# Patient Record
Sex: FEMALE | Race: OTHER | URBAN - METROPOLITAN AREA
[De-identification: names, ages, dates, MRNs, and addresses within clinical notes are randomized per-mention and may not be internally consistent; named-entity substitution may affect disease eponyms.]

---

## 2024-05-08 ENCOUNTER — OFFICE VISIT (OUTPATIENT)
Age: 39
End: 2024-05-08
Payer: COMMERCIAL

## 2024-05-08 VITALS
SYSTOLIC BLOOD PRESSURE: 120 MMHG | WEIGHT: 256 LBS | OXYGEN SATURATION: 99 % | HEART RATE: 73 BPM | BODY MASS INDEX: 43.94 KG/M2 | DIASTOLIC BLOOD PRESSURE: 80 MMHG

## 2024-05-08 DIAGNOSIS — M25.50 POLYARTHRALGIA: ICD-10-CM

## 2024-05-08 DIAGNOSIS — R42 VERTIGO: ICD-10-CM

## 2024-05-08 DIAGNOSIS — R53.83 FATIGUE, UNSPECIFIED TYPE: ICD-10-CM

## 2024-05-08 DIAGNOSIS — E55.9 VITAMIN D DEFICIENCY: ICD-10-CM

## 2024-05-08 DIAGNOSIS — R79.0 LOW FERRITIN LEVEL: ICD-10-CM

## 2024-05-08 DIAGNOSIS — G43.719 INTRACTABLE CHRONIC MIGRAINE WITHOUT AURA AND WITHOUT STATUS MIGRAINOSUS: ICD-10-CM

## 2024-05-08 DIAGNOSIS — G43.719 INTRACTABLE CHRONIC MIGRAINE WITHOUT AURA AND WITHOUT STATUS MIGRAINOSUS: Primary | ICD-10-CM

## 2024-05-08 PROBLEM — E78.00 HIGH BLOOD CHOLESTEROL LEVEL: Status: ACTIVE | Noted: 2024-05-08

## 2024-05-08 PROBLEM — E78.00 HYPERCHOLESTEROLEMIA: Status: ACTIVE | Noted: 2018-10-02

## 2024-05-08 PROCEDURE — 99205 OFFICE O/P NEW HI 60 MIN: CPT | Performed by: PSYCHIATRY & NEUROLOGY

## 2024-05-08 RX ORDER — TOPIRAMATE 50 MG/1
50 TABLET, FILM COATED ORAL DAILY
Qty: 90 TABLET | Refills: 3 | Status: SHIPPED | OUTPATIENT
Start: 2024-05-08

## 2024-05-08 ASSESSMENT — PATIENT HEALTH QUESTIONNAIRE - PHQ9
SUM OF ALL RESPONSES TO PHQ QUESTIONS 1-9: 0
1. LITTLE INTEREST OR PLEASURE IN DOING THINGS: NOT AT ALL
SUM OF ALL RESPONSES TO PHQ QUESTIONS 1-9: 0

## 2024-05-08 NOTE — ASSESSMENT & PLAN NOTE
Longstanding headaches and migraines seemingly moving into transformed migraines patient is to be on preventative therapy we discussed options we have elected to go on Topamax we will start with 50 mg nightly, and serious side effects were discussed    Patient has been encouraged to make sure she increases her fluid intake of free water and we will check additional lab work to make sure there is no contributing factors    At this time no rescue medicine will be prescribed

## 2024-05-08 NOTE — ASSESSMENT & PLAN NOTE
Possibly with sleep disorder referral to sleep clinic.  Also checking blood work for additional pathology contributing to symptoms

## 2024-05-08 NOTE — PATIENT INSTRUCTIONS
As a reminder:   Please come to your appointment 15 minutes before your office appointment.  This way, you can get checked in at the  and checked in by the nursing staff so you have the full allotment of time with your provider for your visit.  Please bring an up-to-date and accurate list of all your medications.  Or bring all your active prescription bottles with you at the time of your office visit and this includes over-the-counter medications so we can make sure that your medication list is up-to-date.  If you are scheduled for a virtual visit, please be aware that the  will need to check you in and usually the day before to verify insurance and collect co-pays as appropriate.  Please be prepared for the second call which will be from the nurse to go over your medications and any other vital information.  This will probably be done 30 minutes prior to your visit.  The reason why we do this early is that you can get the full benefit of your appointment time with your provider.  Finally you will be given the link for your virtual visit please click into your link 10 minutes prior to your appointment and please wait patiently for the provider to join you        As per discussion  We have given you a medical release form please fill out the information regarding your ENT provider in [place] and mail that out to them so we can get the records.  If you are able to find medical records at home bring it with you at your next office visit    We have started you on a medication called Topamax other 90s known as topiramate 50 mg take 1 at bedtime.  This is to help reduce frequency intensity and duration of your migraines.  In about 6 weeks if you are not noticing any significant improvement your migraines reach out to me via [employer] and let me know and I can increase the Topamax to a total of 100 mg as long as you are not having any side effects which I am not anticipating    Increase your fluid intake

## 2024-05-08 NOTE — ASSESSMENT & PLAN NOTE
Still occasionally getting intermittent bouts of vertigo with tinnitus  I have asked the patient to sign a medical consent so I can get records from her ENT in [place] for review     Lab work has been ordered.  We are going to manage her migraines this may be component of her migraines.  We might need to do neurodiagnostics but I like to review the records first before ordering any additional tests

## 2024-05-08 NOTE — PROGRESS NOTES
Daily headaches, said it varies in pain levels  Mentions typically R sided but at times can be the whole head  Nausea, dizziness  Possible presyncope   PRN takes OTC, will help minor  Has been prescribed Fioricet but caused excessive fatigue    
  Component Value Date    TSH 2.25 04/19/2023     Lab Results   Component Value Date/Time    VITD25 11.2 04/19/2023 09:50 AM            No Known Allergies     Current Outpatient Medications   Medication Sig Dispense Refill    topiramate (TOPAMAX) 50 MG tablet Take 1 tablet by mouth daily 90 tablet 3     No current facility-administered medications for this visit.        Past medical history/surgical history, family history, and social history have been reviewed for today's visit      ROS    Review of Systems   Musculoskeletal:  Positive for arthralgias.   Neurological:  Positive for dizziness.        And intermittent tinnitus   All other systems reviewed and are negative.             EXAMINATION:     Vitals:    05/08/24 1002   BP: 120/80   Site: Left Upper Arm   Position: Sitting   Cuff Size: Large Adult   Pulse: 73   SpO2: 99%   Weight: 116.1 kg (256 lb)              5/8/2024    10:04 AM   PHQ-9    Little interest or pleasure in doing things 0   PHQ-9 Total Score 0        General:  Patient is well-developed and well-nourished in no apparent distress  Affect is appropriate   Normocephalic without evidence of trauma  Heart sounds normal no additional sounds heard breath sounds clear to auscultation no cervical masses or bruits appreciated  No tenderness in the head or neck area to palpation  Good peripheral pulses no edema is noted  No rashes unusual bruising or bleeding lacerations are atypical discoloration appreciated on general inspection    Neuro Exam  Mental status  Alert and oriented x3  No word blocking appreciated  No evidence of processing difficulty on general observation  Able to follow commands without difficulty    Cranial nerves  PERRLA   eyes move in all planes  No nystagmus appreciated  Gaze is conjugate  Good facial symmetry  Facial sensation intact bilaterally in all 3 zones  Good voice projection and articulation  Uvula elevates symmetrically  No evidence of swallowing difficulties  Tongue

## 2024-05-09 LAB
25(OH)D3 SERPL-MCNC: 28.9 NG/ML (ref 30–100)
CRP SERPL-MCNC: <0.29 MG/DL (ref 0–0.3)
ERYTHROCYTE [SEDIMENTATION RATE] IN BLOOD: 4 MM/HR (ref 0–20)
FERRITIN SERPL-MCNC: 3 NG/ML (ref 8–252)

## 2024-05-10 RX ORDER — FERROUS SULFATE 325(65) MG
325 TABLET ORAL
Qty: 90 TABLET | Refills: 0 | Status: SHIPPED | OUTPATIENT
Start: 2024-05-10

## 2024-05-12 LAB
ANA TITR SER IF: NEGATIVE
LABORATORY COMMENT REPORT: NORMAL

## 2024-08-12 ENCOUNTER — OFFICE VISIT (OUTPATIENT)
Age: 39
End: 2024-08-12
Payer: COMMERCIAL

## 2024-08-12 VITALS
HEART RATE: 73 BPM | SYSTOLIC BLOOD PRESSURE: 120 MMHG | OXYGEN SATURATION: 97 % | HEIGHT: 64 IN | DIASTOLIC BLOOD PRESSURE: 64 MMHG | TEMPERATURE: 98 F | WEIGHT: 252.1 LBS | BODY MASS INDEX: 43.04 KG/M2

## 2024-08-12 DIAGNOSIS — E66.01 SEVERE OBESITY (BMI >= 40) (HCC): ICD-10-CM

## 2024-08-12 DIAGNOSIS — G47.33 OBSTRUCTIVE SLEEP APNEA (ADULT) (PEDIATRIC): Primary | ICD-10-CM

## 2024-08-12 PROCEDURE — 99204 OFFICE O/P NEW MOD 45 MIN: CPT | Performed by: INTERNAL MEDICINE

## 2024-08-12 ASSESSMENT — SLEEP AND FATIGUE QUESTIONNAIRES
HOW LIKELY ARE YOU TO NOD OFF OR FALL ASLEEP WHILE SITTING INACTIVE IN A PUBLIC PLACE: WOULD NEVER DOZE
HOW LIKELY ARE YOU TO NOD OFF OR FALL ASLEEP WHILE SITTING QUIETLY AFTER LUNCH WITHOUT ALCOHOL: WOULD NEVER DOZE
HOW LIKELY ARE YOU TO NOD OFF OR FALL ASLEEP IN A CAR, WHILE STOPPED FOR A FEW MINUTES IN TRAFFIC: WOULD NEVER DOZE
HOW LIKELY ARE YOU TO NOD OFF OR FALL ASLEEP WHILE SITTING AND READING: WOULD NEVER DOZE
HOW LIKELY ARE YOU TO NOD OFF OR FALL ASLEEP WHILE SITTING AND TALKING TO SOMEONE: WOULD NEVER DOZE
ESS TOTAL SCORE: 4
HOW LIKELY ARE YOU TO NOD OFF OR FALL ASLEEP WHILE LYING DOWN TO REST IN THE AFTERNOON WHEN CIRCUMSTANCES PERMIT: MODERATE CHANCE OF DOZING
HOW LIKELY ARE YOU TO NOD OFF OR FALL ASLEEP WHEN YOU ARE A PASSENGER IN A CAR FOR AN HOUR WITHOUT A BREAK: WOULD NEVER DOZE
HOW LIKELY ARE YOU TO NOD OFF OR FALL ASLEEP WHILE WATCHING TV: MODERATE CHANCE OF DOZING

## 2024-08-12 NOTE — PROGRESS NOTES
[address]  [place] [address]  Tel.  [phone]  Fax. [phone] [address]  [place] [address]  Tel.  [phone]  Fax. [phone] [address] [address]  Tel.  [phone]  Fax. [phone]         Subjective:      Shantell Cardenas is an 39 y.o. female referred for evaluation for a sleep disorder. She complains of frequent morning headaches associated with snoring.  Symptoms began several years ago, gradually improving since that time. She usually can fall asleep in 10-15 minutes.  Family or house members note snoring. She denies falling asleep when driving.  Shantell Cardenas does not wake up frequently at night. She is not bothered by waking up too early and left unable to get back to sleep. She actually sleeps about 7 hours at night and wakes up about   0-1 times during the night. She does work shifts: [employer].   Shantell indicates she does not get too little sleep at night. Her bedtime is 1100. She awakens at 0600. She does take naps. She takes 2 naps a week lasting 30 to 45. She has the following observed behaviors:  ;  .  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 a theatre or a meeting) 0   As a passenger in a car for an hour without a break 0   Lying down to rest in the afternoon when circumstances permit 2   Sitting and talking to someone 0   Sitting quietly after a lunch without alcohol 0   In a car, while stopped for a few minutes in traffic 0   [place] Sleepiness Score 4   Neck (Inches) 15       No Known Allergies      Current Outpatient Medications:     VITAMIN D PO, Take by mouth, Disp: , Rfl:     ferrous sulfate (IRON 325) 325 (65 Fe) MG tablet, Take 1 tablet by mouth daily (with breakfast), Disp: 90 tablet, Rfl: 0    topiramate (TOPAMAX) 50 MG

## 2024-09-05 ENCOUNTER — PROCEDURE VISIT (OUTPATIENT)
Age: 39
End: 2024-09-05

## 2024-09-05 ENCOUNTER — HOSPITAL ENCOUNTER (OUTPATIENT)
Facility: HOSPITAL | Age: 39
Discharge: HOME | End: 2024-09-08
Payer: COMMERCIAL

## 2024-09-05 DIAGNOSIS — G47.33 OBSTRUCTIVE SLEEP APNEA (ADULT) (PEDIATRIC): Primary | ICD-10-CM

## 2024-09-05 PROCEDURE — 95800 SLP STDY UNATTENDED: CPT | Performed by: INTERNAL MEDICINE

## 2024-09-05 NOTE — PROGRESS NOTES
S>Shantell Cardenas is a 39 y.o. female seen today to receive a home sleep testing unit (WatchPAT).    Patient was educated on proper hookup and operation of the WatchPAT HST via detailed instruction sheet .  Instruction forms with after hours contact and documentation were signed.    O>    There were no vitals taken for this visit.      A>  1. Obstructive sleep apnea (adult) (pediatric)          P>  General information regarding operations and maintenance of the device was provided.  Follow-up appointment was made to return the WatchPAT HST. She will be contacted once the results have been reviewed.  She was asked to contact our office for any problems regarding her home sleep test study.

## 2024-09-10 ENCOUNTER — CLINICAL DOCUMENTATION (OUTPATIENT)
Age: 39
End: 2024-09-10

## 2024-09-12 ENCOUNTER — TELEPHONE (OUTPATIENT)
Age: 39
End: 2024-09-12

## 2024-11-19 ENCOUNTER — OFFICE VISIT (OUTPATIENT)
Age: 39
End: 2024-11-19
Payer: COMMERCIAL

## 2024-11-19 VITALS
BODY MASS INDEX: 42.47 KG/M2 | HEART RATE: 58 BPM | WEIGHT: 248.8 LBS | TEMPERATURE: 97.9 F | HEIGHT: 64 IN | OXYGEN SATURATION: 99 % | SYSTOLIC BLOOD PRESSURE: 122 MMHG | RESPIRATION RATE: 16 BRPM | DIASTOLIC BLOOD PRESSURE: 60 MMHG

## 2024-11-19 DIAGNOSIS — R79.0 LOW FERRITIN LEVEL: ICD-10-CM

## 2024-11-19 DIAGNOSIS — R53.83 OTHER FATIGUE: ICD-10-CM

## 2024-11-19 DIAGNOSIS — G43.719 INTRACTABLE CHRONIC MIGRAINE WITHOUT AURA AND WITHOUT STATUS MIGRAINOSUS: Primary | ICD-10-CM

## 2024-11-19 DIAGNOSIS — E55.9 VITAMIN D DEFICIENCY: ICD-10-CM

## 2024-11-19 PROCEDURE — 99214 OFFICE O/P EST MOD 30 MIN: CPT | Performed by: PSYCHIATRY & NEUROLOGY

## 2024-11-19 ASSESSMENT — PATIENT HEALTH QUESTIONNAIRE - PHQ9
1. LITTLE INTEREST OR PLEASURE IN DOING THINGS: NOT AT ALL
SUM OF ALL RESPONSES TO PHQ QUESTIONS 1-9: 0
SUM OF ALL RESPONSES TO PHQ9 QUESTIONS 1 & 2: 0
SUM OF ALL RESPONSES TO PHQ QUESTIONS 1-9: 0
SUM OF ALL RESPONSES TO PHQ QUESTIONS 1-9: 0
2. FEELING DOWN, DEPRESSED OR HOPELESS: NOT AT ALL
SUM OF ALL RESPONSES TO PHQ QUESTIONS 1-9: 0

## 2024-11-19 NOTE — ASSESSMENT & PLAN NOTE
Much improved continue on iron supplementation     She was assessed for sleep apnea by Dr. Zelaya and found not to have sleep apnea.  This was completed September 2024

## 2024-11-19 NOTE — ASSESSMENT & PLAN NOTE
Much improved with reduction in frequency intensity and duration of her migraines since starting Topamax 50 mg daily.  She is tolerating the medication without any significant side effects    There is some weight loss appropriately but she attributes this to improving diet and exercising    She is not needing to take any rescue medication, not even OTCs, nor is she having any incapacitating headaches and migraines    Patient has been encouraged to continue with a healthy diet and exercise along with maintaining Topamax 50 mg nightly

## 2024-11-19 NOTE — ASSESSMENT & PLAN NOTE
Remains on iron replacement.  Fatigue is dramatically improved.  We will check ferritin level today to determine need for continuation of prescription iron

## 2024-11-19 NOTE — PROGRESS NOTES
[facility]  [facility]  [address]  [place] [address]  Phone: [phone] fax: [phone]          Shantell Cardenas is a 39 y.o. female who presents today for the following:  Chief Complaint   Patient presents with    Follow-up     Patient states that her migraines are doing good.         ASSESSMENT AND PLAN    1. Intractable chronic migraine without aura and without status migrainosus  Assessment & Plan:  Much improved with reduction in frequency intensity and duration of her migraines since starting Topamax 50 mg daily.  She is tolerating the medication without any significant side effects    There is some weight loss appropriately but she attributes this to improving diet and exercising    She is not needing to take any rescue medication, not even OTCs, nor is she having any incapacitating headaches and migraines    Patient has been encouraged to continue with a healthy diet and exercise along with maintaining Topamax 50 mg nightly     2. Vitamin D deficiency  Assessment & Plan:  Patient remains on her vitamin D supplementation she is using over-the-counter.  Target range would be to get her level greater than 40.  We will recheck her levels today.  For now she is to continue on over-the-counter replacement   Orders:  -     Vitamin D 25 Hydroxy; Future  3. Low ferritin level  Assessment & Plan:  Remains on iron replacement.  Fatigue is dramatically improved.  We will check ferritin level today to determine need for continuation of prescription iron   Orders:  -     Ferritin; Future  4. Other fatigue  Assessment & Plan:  Much improved continue on iron supplementation     She was assessed for sleep apnea by Dr. Zelaya and found not to have sleep apnea.  This was completed September 2024        Return in about 6 months (around 5/19/2025) for In office.     Patient and/or family was given time to ask questions and voice concerns. I believe

## 2024-11-19 NOTE — ASSESSMENT & PLAN NOTE
Patient remains on her vitamin D supplementation she is using over-the-counter.  Target range would be to get her level greater than 40.  We will recheck her levels today.  For now she is to continue on over-the-counter replacement

## 2024-11-19 NOTE — PATIENT INSTRUCTIONS
As a reminder:   Please come to your appointment 15 minutes before your office appointment.  This way, you can get checked in at the  and checked in by the nursing staff so you have the full allotment of time with your provider for your visit.  Please bring an up-to-date and accurate list of all your medications.  Or bring all your active prescription bottles with you at the time of your office visit and this includes over-the-counter medications so we can make sure that your medication list is up-to-date.  If you are scheduled for a virtual visit, please be aware that the  will need to check you in and usually the day before to verify insurance and collect co-pays as appropriate.  Please be prepared for the second call which will be from the nurse to go over your medications and any other vital information.  This will probably be done 30 minutes prior to your visit.  The reason why we do this early is that you can get the full benefit of your appointment time with your provider.  Finally you will be given the link for your virtual visit please click into your link 10 minutes prior to your appointment and please wait patiently for the provider to join you        As per discussion  Patient Information:  Name: Shantell Cardenas  Age: 39  Medical History: The patient has a history of headaches, iron deficiency, and vitamin D deficiency.    Reason for Visit:  Shantell came in for an evaluation of her headaches. She reported that her headaches have improved, now occurring approximately three times every two weeks with decreased severity. She also mentioned her regular menstrual cycles and a weight loss of about 20 pounds since April 2023.    Clinical Findings:  - Headaches have decreased in frequency and severity.  - Menstrual cycles are regular, occurring every 28 days, and are occasionally heavy.  - Weight has decreased from 260 pounds in April 2023 to 248 pounds currently.    Diagnosis:  -

## 2024-11-20 LAB
25(OH)D3 SERPL-MCNC: 22 NG/ML (ref 30–100)
FERRITIN SERPL-MCNC: 8 NG/ML (ref 26–388)

## 2024-11-20 RX ORDER — ERGOCALCIFEROL 1.25 MG/1
50000 CAPSULE, LIQUID FILLED ORAL WEEKLY
Qty: 12 CAPSULE | Refills: 3 | Status: SHIPPED | OUTPATIENT
Start: 2024-11-20

## 2024-12-03 ENCOUNTER — TELEPHONE (OUTPATIENT)
Age: 39
End: 2024-12-03

## 2024-12-03 NOTE — TELEPHONE ENCOUNTER
Called pt, unable to LM       ----- Message from SANYA Scott NP sent at 12/3/2024  8:49 AM EST -----  Patient needs OV for labs and to discuss treatment for her low iron at the request of her neurology specialist.  ----- Message -----  From: Sheela Lou ANP  Sent: 12/3/2024   7:46 AM EST  To: SANYA Lombardi NP    Thanks so much for the update.  Please keep me informed as to whether we are able to contact the patient and get her set up for iron infusions.  I appreciate your feedback  ----- Message -----  From: Rafaela Mclaughlin APRN - NP  Sent: 11/27/2024   3:34 PM EST  To: GERI Talbert,  I haven't seen her in some time; she would benefit from IV iron if not improving which we can get done locally.  I'll see if my PSRs can reach her to recheck in the next few months.  Thanks,  Rafaela  ----- Message -----  From: Sheela Lou ANP  Sent: 11/20/2024   7:55 AM EST  To: SANYA Lombardi NP    I am seeing our mutual patient for headaches and migraines.  She is doing better with her migraines.  Her vitamin D is low and I have got on supplementation.    Of concern is that her ferritin level 6 months ago was 3 it is now up to 8 on an iron tablet a day I have asked her to continue with this and I have given her a list of food sources high in iron.  I am not sure how often you actually see her but we may need to keep an eye on this.  Just wanted to make you aware

## 2025-01-08 DIAGNOSIS — R79.0 LOW FERRITIN LEVEL: ICD-10-CM

## 2025-01-09 RX ORDER — FERROUS SULFATE 325(65) MG
1 TABLET ORAL
Qty: 90 TABLET | Refills: 0 | Status: SHIPPED | OUTPATIENT
Start: 2025-01-09

## 2025-07-10 ENCOUNTER — OFFICE VISIT (OUTPATIENT)
Age: 40
End: 2025-07-10
Payer: COMMERCIAL

## 2025-07-10 VITALS
SYSTOLIC BLOOD PRESSURE: 112 MMHG | HEIGHT: 64 IN | RESPIRATION RATE: 15 BRPM | OXYGEN SATURATION: 100 % | WEIGHT: 253 LBS | BODY MASS INDEX: 43.19 KG/M2 | DIASTOLIC BLOOD PRESSURE: 82 MMHG | HEART RATE: 69 BPM

## 2025-07-10 DIAGNOSIS — G43.719 INTRACTABLE CHRONIC MIGRAINE WITHOUT AURA AND WITHOUT STATUS MIGRAINOSUS: ICD-10-CM

## 2025-07-10 DIAGNOSIS — R79.0 LOW FERRITIN LEVEL: ICD-10-CM

## 2025-07-10 DIAGNOSIS — R42 VERTIGO: ICD-10-CM

## 2025-07-10 DIAGNOSIS — D50.9 IRON DEFICIENCY ANEMIA, UNSPECIFIED IRON DEFICIENCY ANEMIA TYPE: ICD-10-CM

## 2025-07-10 DIAGNOSIS — E55.9 VITAMIN D DEFICIENCY: ICD-10-CM

## 2025-07-10 DIAGNOSIS — G43.719 INTRACTABLE CHRONIC MIGRAINE WITHOUT AURA AND WITHOUT STATUS MIGRAINOSUS: Primary | ICD-10-CM

## 2025-07-10 PROCEDURE — G2211 COMPLEX E/M VISIT ADD ON: HCPCS | Performed by: PSYCHIATRY & NEUROLOGY

## 2025-07-10 PROCEDURE — 99214 OFFICE O/P EST MOD 30 MIN: CPT | Performed by: PSYCHIATRY & NEUROLOGY

## 2025-07-10 RX ORDER — TOPIRAMATE 50 MG/1
50 TABLET, FILM COATED ORAL 2 TIMES DAILY
Qty: 180 TABLET | Refills: 3 | Status: SHIPPED | OUTPATIENT
Start: 2025-07-10

## 2025-07-10 RX ORDER — RIZATRIPTAN BENZOATE 10 MG/1
TABLET ORAL
Qty: 9 TABLET | Refills: 11 | Status: SHIPPED | OUTPATIENT
Start: 2025-07-10

## 2025-07-10 ASSESSMENT — PATIENT HEALTH QUESTIONNAIRE - PHQ9
SUM OF ALL RESPONSES TO PHQ QUESTIONS 1-9: 0
1. LITTLE INTEREST OR PLEASURE IN DOING THINGS: NOT AT ALL
2. FEELING DOWN, DEPRESSED OR HOPELESS: NOT AT ALL
SUM OF ALL RESPONSES TO PHQ QUESTIONS 1-9: 0

## 2025-07-10 NOTE — PROGRESS NOTES
1. Have you been to the ER, urgent care clinic since your last visit?  Hospitalized since your last visit?  No.    2. Have you seen or consulted any other health care providers outside of the [facility] since your last visit?  Include any pap smears or colon screening.   No.      Chief Complaint   Patient presents with    Migraine     Dizziness before or at same of migraines        
2.25 04/19/2023     Lab Results   Component Value Date/Time    VITD25 22.0 11/19/2024 11:47 AM            No Known Allergies     Current Outpatient Medications   Medication Sig Dispense Refill    rizatriptan (MAXALT) 10 MG tablet 1 tablet at onset of migraine may repeat every 2 hours to a maximum of 3 tablets per 24 hours in 2 days per 7-day cycle 9 tablet 11    topiramate (TOPAMAX) 50 MG tablet Take 1 tablet by mouth 2 times daily 180 tablet 3    SV IRON 325 (65 Fe) MG tablet Take 1 tablet by mouth once daily with breakfast 90 tablet 0    vitamin D (ERGOCALCIFEROL) 1.25 MG (04641 UT) CAPS capsule Take 1 capsule by mouth once a week 12 capsule 3     No current facility-administered medications for this visit.        Past medical history/surgical history, family history, and social history have been reviewed for today's visit      ROS    Review of Systems   Neurological:  Positive for dizziness.        And intermittent tinnitus   All other systems reviewed and are negative.             EXAMINATION:     Vitals:    07/10/25 0959   BP: 112/82   BP Site: Left Lower Arm   Patient Position: Sitting   BP Cuff Size: Medium Adult   Pulse: 69   Resp: 15   SpO2: 100%   Weight: 114.8 kg (253 lb)   Height: 1.626 m (5' 4\")                7/10/2025    10:02 AM   PHQ-9    Little interest or pleasure in doing things 0   Feeling down, depressed, or hopeless 0   PHQ-2 Score 0   PHQ-9 Total Score 0        General:  Patient is well-developed and well-nourished in no apparent distress  Affect is appropriate   Normocephalic without evidence of trauma  Heart sounds normal no additional sounds heard breath sounds clear to auscultation no cervical masses or bruits appreciated  No tenderness in the head or neck area to palpation  Good peripheral pulses no edema is noted  No rashes unusual bruising or bleeding lacerations are atypical discoloration appreciated on general inspection  Tympanic membrane normal although the left tympanic membrane

## 2025-07-10 NOTE — ASSESSMENT & PLAN NOTE
Remains on iron replacement.  Fatigue is dramatically improved.  We will check ferritin level today to determine need for continuation of prescription.

## 2025-07-10 NOTE — ASSESSMENT & PLAN NOTE
Still continues with intermittent dizzy/vertigo episodes and intermittent tinnitus unclear if this is related to migraines.  We have increased Topamax to a total of 100 mg a day    I like to check some additional blood work to see if there is any contributing factors and I would like to check an MRI of the brain with special attention to IACs to look for any contributing pathology

## 2025-07-10 NOTE — PATIENT INSTRUCTIONS
As per discussion  Increase the Topamax to total of 50 mg 2 tablets a day for 100 mg  I have given you a prescription for Maxalt the other name is rizatriptan for when you get those bad headaches to have them to go away more quickly    Unclear what is going on with these dizzy type events that you are getting.  Lets check some blood work and get an MRI of the brain and see what is going on    It is possible the increase in Topamax might not only help the headaches but it may also help the spells as well            As a reminder:   Please come to your appointment 15 minutes before your office appointment.  This way, you can get checked in at the  and checked in by the nursing staff so you have the full allotment of time with your provider for your visit.  Please bring an up-to-date and accurate list of all your medications.  Or bring all your active prescription bottles with you at the time of your office visit and this includes over-the-counter medications so we can make sure that your medication list is up-to-date.  If you are scheduled for a virtual visit, please be aware that the  will need to check you in and usually the day before to verify insurance and collect co-pays as appropriate.  Please be prepared for the second call which will be from the nurse to go over your medications and any other vital information.  This will probably be done 30 minutes prior to your visit.  The reason why we do this early is that you can get the full benefit of your appointment time with your provider.  Finally you will be given the link for your virtual visit please click into your link 10 minutes prior to your appointment and please wait patiently for the provider to join you            Office Policies    Phone calls/patient messages:  Please allow up to 72 hours  for someone in the office to contact you about your call or message. Be mindful your provider may be out of the office or your message may

## 2025-07-10 NOTE — ASSESSMENT & PLAN NOTE
Will check vitamin D level target ranges to make sure it is greater than 40 will adjust supplementation based on lab results

## 2025-07-10 NOTE — ASSESSMENT & PLAN NOTE
Would like to increase the Topamax to 50 mg 2/day.  She is still getting incapacitating headaches although overall the frequency intensity and duration have reduced over time.    There may or may not be a vertiginous component to this so increasing the Topamax to 50 mg 2 a day may help with this other complaint.     Would like to give her rizatriptan to take as needed for the more intense headaches for the more mild headache she can continue to use Tylenol if needed often times she does not take anything for the more mild headaches

## 2025-07-11 RX ORDER — TOPIRAMATE 50 MG/1
50 TABLET, FILM COATED ORAL DAILY
Qty: 30 TABLET | Refills: 0 | OUTPATIENT
Start: 2025-07-11

## 2025-07-12 LAB
25(OH)D3 SERPL-MCNC: 12.8 NG/ML (ref 30–100)
ALBUMIN SERPL-MCNC: 3.7 G/DL (ref 3.5–5)
ALBUMIN/GLOB SERPL: 1 (ref 1.1–2.2)
ALP SERPL-CCNC: 105 U/L (ref 45–117)
ALT SERPL-CCNC: 24 U/L (ref 12–78)
ANION GAP SERPL CALC-SCNC: 8 MMOL/L (ref 2–12)
AST SERPL-CCNC: 23 U/L (ref 15–37)
BASOPHILS # BLD: 0.04 K/UL (ref 0–0.1)
BASOPHILS NFR BLD: 0.7 % (ref 0–1)
BILIRUB SERPL-MCNC: 0.3 MG/DL (ref 0.2–1)
BUN SERPL-MCNC: 12 MG/DL (ref 6–20)
BUN/CREAT SERPL: 16 (ref 12–20)
CALCIUM SERPL-MCNC: 9.2 MG/DL (ref 8.5–10.1)
CHLORIDE SERPL-SCNC: 106 MMOL/L (ref 97–108)
CO2 SERPL-SCNC: 25 MMOL/L (ref 21–32)
CREAT SERPL-MCNC: 0.73 MG/DL (ref 0.55–1.02)
DIFFERENTIAL METHOD BLD: ABNORMAL
EOSINOPHIL # BLD: 0.05 K/UL (ref 0–0.4)
EOSINOPHIL NFR BLD: 0.9 % (ref 0–7)
ERYTHROCYTE [DISTWIDTH] IN BLOOD BY AUTOMATED COUNT: 16.5 % (ref 11.5–14.5)
FERRITIN SERPL-MCNC: 3 NG/ML (ref 8–252)
GLOBULIN SER CALC-MCNC: 3.6 G/DL (ref 2–4)
GLUCOSE SERPL-MCNC: 80 MG/DL (ref 65–100)
HCT VFR BLD AUTO: 37 % (ref 35–47)
HGB BLD-MCNC: 10.5 G/DL (ref 11.5–16)
IMM GRANULOCYTES # BLD AUTO: 0.01 K/UL (ref 0–0.04)
IMM GRANULOCYTES NFR BLD AUTO: 0.2 % (ref 0–0.5)
LYMPHOCYTES # BLD: 2.08 K/UL (ref 0.8–3.5)
LYMPHOCYTES NFR BLD: 37.8 % (ref 12–49)
MCH RBC QN AUTO: 22.6 PG (ref 26–34)
MCHC RBC AUTO-ENTMCNC: 28.4 G/DL (ref 30–36.5)
MCV RBC AUTO: 79.6 FL (ref 80–99)
MONOCYTES # BLD: 0.59 K/UL (ref 0–1)
MONOCYTES NFR BLD: 10.8 % (ref 5–13)
NEUTS SEG # BLD: 2.73 K/UL (ref 1.8–8)
NEUTS SEG NFR BLD: 49.6 % (ref 32–75)
NRBC # BLD: 0 K/UL (ref 0–0.01)
NRBC BLD-RTO: 0 PER 100 WBC
PLATELET # BLD AUTO: 335 K/UL (ref 150–400)
PMV BLD AUTO: 10.9 FL (ref 8.9–12.9)
POTASSIUM SERPL-SCNC: 4.5 MMOL/L (ref 3.5–5.1)
PROT SERPL-MCNC: 7.3 G/DL (ref 6.4–8.2)
RBC # BLD AUTO: 4.65 M/UL (ref 3.8–5.2)
RBC MORPH BLD: ABNORMAL
RBC MORPH BLD: ABNORMAL
SODIUM SERPL-SCNC: 139 MMOL/L (ref 136–145)
TSH SERPL DL<=0.05 MIU/L-ACNC: 1.29 UIU/ML (ref 0.36–3.74)
WBC # BLD AUTO: 5.5 K/UL (ref 3.6–11)

## 2025-07-16 ENCOUNTER — HOSPITAL ENCOUNTER (EMERGENCY)
Facility: HOSPITAL | Age: 40
Discharge: HOME | End: 2025-07-16
Attending: FAMILY MEDICINE | Admitting: FAMILY MEDICINE
Payer: COMMERCIAL

## 2025-07-16 VITALS
WEIGHT: 253 LBS | SYSTOLIC BLOOD PRESSURE: 130 MMHG | BODY MASS INDEX: 43.19 KG/M2 | TEMPERATURE: 98.1 F | HEIGHT: 64 IN | RESPIRATION RATE: 17 BRPM | HEART RATE: 73 BPM | OXYGEN SATURATION: 98 % | DIASTOLIC BLOOD PRESSURE: 74 MMHG

## 2025-07-16 DIAGNOSIS — G43.901 MIGRAINE WITH STATUS MIGRAINOSUS, NOT INTRACTABLE, UNSPECIFIED MIGRAINE TYPE: Primary | ICD-10-CM

## 2025-07-16 LAB
ALBUMIN SERPL-MCNC: 3.3 G/DL (ref 3.5–5)
ALBUMIN/GLOB SERPL: 1 (ref 1.1–2.2)
ALP SERPL-CCNC: 111 U/L (ref 45–117)
ALT SERPL-CCNC: 16 U/L (ref 12–78)
ANION GAP SERPL CALC-SCNC: 9 MMOL/L (ref 2–12)
AST SERPL-CCNC: 31 U/L (ref 15–37)
BASOPHILS # BLD: 0.03 K/UL (ref 0–0.1)
BASOPHILS NFR BLD: 0.4 % (ref 0–1)
BILIRUB SERPL-MCNC: 0.2 MG/DL (ref 0.2–1)
BUN SERPL-MCNC: 19 MG/DL (ref 6–20)
BUN/CREAT SERPL: 23 (ref 12–20)
CALCIUM SERPL-MCNC: 8.5 MG/DL (ref 8.5–10.1)
CHLORIDE SERPL-SCNC: 105 MMOL/L (ref 97–108)
CO2 SERPL-SCNC: 25 MMOL/L (ref 21–32)
CREAT SERPL-MCNC: 0.83 MG/DL (ref 0.55–1.02)
DIFFERENTIAL METHOD BLD: ABNORMAL
EOSINOPHIL # BLD: 0.06 K/UL (ref 0–0.4)
EOSINOPHIL NFR BLD: 0.9 % (ref 0–0.7)
ERYTHROCYTE [DISTWIDTH] IN BLOOD BY AUTOMATED COUNT: 16.7 % (ref 11.5–14.5)
GLOBULIN SER CALC-MCNC: 3.4 G/DL (ref 2–4)
GLUCOSE SERPL-MCNC: 84 MG/DL (ref 65–100)
HCT VFR BLD AUTO: 33.5 % (ref 35–47)
HGB BLD-MCNC: 9.9 G/DL (ref 11.5–16)
IMM GRANULOCYTES # BLD AUTO: 0.01 K/UL (ref 0–0.04)
IMM GRANULOCYTES NFR BLD AUTO: 0.1 % (ref 0–0.5)
LYMPHOCYTES # BLD: 2.33 K/UL (ref 0.8–3.5)
LYMPHOCYTES NFR BLD: 34.4 % (ref 12–49)
MCH RBC QN AUTO: 22.2 PG (ref 26–34)
MCHC RBC AUTO-ENTMCNC: 29.6 G/DL (ref 30–36.5)
MCV RBC AUTO: 75.1 FL (ref 80–99)
MONOCYTES # BLD: 0.55 K/UL (ref 0–1)
MONOCYTES NFR BLD: 8.1 % (ref 5–13)
NEUTS SEG # BLD: 3.79 K/UL (ref 1.8–8)
NEUTS SEG NFR BLD: 56.1 % (ref 32–75)
NRBC # BLD: 0 K/UL (ref 0–0.01)
NRBC BLD-RTO: 0 PER 100 WBC
PLATELET # BLD AUTO: 292 K/UL (ref 150–400)
PMV BLD AUTO: 10.6 FL (ref 8.9–12.9)
POTASSIUM SERPL-SCNC: 5 MMOL/L (ref 3.5–5.1)
PROT SERPL-MCNC: 6.7 G/DL (ref 6.4–8.2)
RBC # BLD AUTO: 4.46 M/UL (ref 3.8–5.2)
SODIUM SERPL-SCNC: 139 MMOL/L (ref 136–145)
WBC # BLD AUTO: 6.8 K/UL (ref 3.6–11)

## 2025-07-16 PROCEDURE — 99284 EMERGENCY DEPT VISIT MOD MDM: CPT

## 2025-07-16 PROCEDURE — 6360000002 HC RX W HCPCS: Performed by: FAMILY MEDICINE

## 2025-07-16 PROCEDURE — 96375 TX/PRO/DX INJ NEW DRUG ADDON: CPT

## 2025-07-16 PROCEDURE — 36415 COLL VENOUS BLD VENIPUNCTURE: CPT

## 2025-07-16 PROCEDURE — 85025 COMPLETE CBC W/AUTO DIFF WBC: CPT

## 2025-07-16 PROCEDURE — 2580000003 HC RX 258: Performed by: FAMILY MEDICINE

## 2025-07-16 PROCEDURE — 80053 COMPREHEN METABOLIC PANEL: CPT

## 2025-07-16 PROCEDURE — 96374 THER/PROPH/DIAG INJ IV PUSH: CPT

## 2025-07-16 RX ORDER — DIPHENHYDRAMINE HYDROCHLORIDE 50 MG/ML
25 INJECTION, SOLUTION INTRAMUSCULAR; INTRAVENOUS
Status: COMPLETED | OUTPATIENT
Start: 2025-07-16 | End: 2025-07-16

## 2025-07-16 RX ORDER — KETOROLAC TROMETHAMINE 15 MG/ML
15 INJECTION, SOLUTION INTRAMUSCULAR; INTRAVENOUS
Status: COMPLETED | OUTPATIENT
Start: 2025-07-16 | End: 2025-07-16

## 2025-07-16 RX ORDER — PROCHLORPERAZINE EDISYLATE 5 MG/ML
10 INJECTION INTRAMUSCULAR; INTRAVENOUS ONCE
Status: COMPLETED | OUTPATIENT
Start: 2025-07-16 | End: 2025-07-16

## 2025-07-16 RX ORDER — 0.9 % SODIUM CHLORIDE 0.9 %
1000 INTRAVENOUS SOLUTION INTRAVENOUS ONCE
Status: COMPLETED | OUTPATIENT
Start: 2025-07-16 | End: 2025-07-16

## 2025-07-16 RX ADMIN — SODIUM CHLORIDE 1000 ML: 0.9 INJECTION, SOLUTION INTRAVENOUS at 19:32

## 2025-07-16 RX ADMIN — KETOROLAC TROMETHAMINE 15 MG: 15 INJECTION, SOLUTION INTRAMUSCULAR; INTRAVENOUS at 19:33

## 2025-07-16 RX ADMIN — DIPHENHYDRAMINE HYDROCHLORIDE 25 MG: 50 INJECTION INTRAMUSCULAR; INTRAVENOUS at 19:37

## 2025-07-16 RX ADMIN — PROCHLORPERAZINE EDISYLATE 10 MG: 5 INJECTION INTRAMUSCULAR; INTRAVENOUS at 19:34

## 2025-07-16 ASSESSMENT — PAIN DESCRIPTION - DESCRIPTORS: DESCRIPTORS: ACHING

## 2025-07-16 ASSESSMENT — PAIN SCALES - GENERAL
PAINLEVEL_OUTOF10: 8
PAINLEVEL_OUTOF10: 2
PAINLEVEL_OUTOF10: 8

## 2025-07-16 ASSESSMENT — LIFESTYLE VARIABLES
HOW MANY STANDARD DRINKS CONTAINING ALCOHOL DO YOU HAVE ON A TYPICAL DAY: PATIENT DOES NOT DRINK
HOW OFTEN DO YOU HAVE A DRINK CONTAINING ALCOHOL: NEVER

## 2025-07-16 ASSESSMENT — PAIN - FUNCTIONAL ASSESSMENT
PAIN_FUNCTIONAL_ASSESSMENT: 0-10
PAIN_FUNCTIONAL_ASSESSMENT: 0-10

## 2025-07-16 ASSESSMENT — PAIN DESCRIPTION - LOCATION
LOCATION: HEAD
LOCATION: HEAD

## 2025-07-16 NOTE — ED TRIAGE NOTES
Pt reports migraine headaches for a week. Worsening even after rx meds (topiramate). Pt reports that she was recently told she was anemic and placed on iron.

## 2025-07-17 NOTE — ED NOTES
Patient discharged, advised to wait in ED waiting area, patient refused and verbalized that she and her  wish to wait out in the main lobby area for their ride. Charge nurse aware

## 2025-07-17 NOTE — ED PROVIDER NOTES
[facility] EMERGENCY DEPARTMENT  EMERGENCY DEPARTMENT ENCOUNTER       Pt Name: Shantell Cardenas  MRN: [id number]  Birthdate 1985  Date of evaluation: 7/16/2025  Provider: Brooke Sandoval MD   PCP: Rafaela Mclaughlin APRN - NP  Note Started: 8:50 PM EDT 7/16/25     CHIEF COMPLAINT       Chief Complaint   Patient presents with    Headache        HISTORY OF PRESENT ILLNESS: 1 or more elements      History From: Patient  HPI Limitations: None     Shantell Cardenas is a 40 y.o. female who presents to the ED with a migraine that she has had for the last week. Pain worsening this afternoon.     Nursing Notes were all reviewed and agreed with or any disagreements were addressed in the HPI.     REVIEW OF SYSTEMS      Review of Systems     Positives and Pertinent negatives as per HPI.    PAST HISTORY     Past Medical History:  Past Medical History:   Diagnosis Date    High blood cholesterol level          Past Surgical History:  Past Surgical History:   Procedure Laterality Date    GASTRIC BYPASS SURGERY N/A     HYSTERECTOMY (CERVIX STATUS UNKNOWN) N/A        Family History:  Family History   Problem Relation Age of Onset    Seizures Mother     High Blood Pressure Mother     Stroke Mother     Heart Disease Mother     Dementia Mother     High Blood Pressure Father     High Cholesterol Father        Social History:  Social History     Tobacco Use    Smoking status: Never    Smokeless tobacco: Never   Vaping Use    Vaping status: Never Used   Substance Use Topics    Alcohol use: Yes     Comment: soccially    Drug use: Never       Allergies:  No Known Allergies    CURRENT MEDICATIONS      Previous Medications    RIZATRIPTAN (MAXALT) 10 MG TABLET    1 tablet at onset of migraine may repeat every 2 hours to a maximum of 3 tablets per 24 hours in 2 days per 7-day cycle    SV IRON 325 (65 FE) MG TABLET    Take 1 tablet by mouth once daily with breakfast    TOPIRAMATE (TOPAMAX) 50 MG TABLET    Take 1

## 2025-07-17 NOTE — DISCHARGE INSTRUCTIONS
--Ibuprofen 600 mg every 6 hours or 800 mg every 8 hours as needed for pain.  --Follow up with Rafaela for migraine medication adjustment.

## 2025-07-22 ENCOUNTER — OFFICE VISIT (OUTPATIENT)
Age: 40
End: 2025-07-22
Payer: COMMERCIAL

## 2025-07-22 VITALS
BODY MASS INDEX: 43.43 KG/M2 | SYSTOLIC BLOOD PRESSURE: 130 MMHG | TEMPERATURE: 98 F | OXYGEN SATURATION: 99 % | DIASTOLIC BLOOD PRESSURE: 82 MMHG | HEART RATE: 80 BPM | RESPIRATION RATE: 20 BRPM | HEIGHT: 64 IN

## 2025-07-22 DIAGNOSIS — Z11.1 SCREENING-PULMONARY TB: Primary | ICD-10-CM

## 2025-07-22 DIAGNOSIS — D50.9 IRON DEFICIENCY ANEMIA, UNSPECIFIED IRON DEFICIENCY ANEMIA TYPE: ICD-10-CM

## 2025-07-22 DIAGNOSIS — E55.9 VITAMIN D DEFICIENCY: ICD-10-CM

## 2025-07-22 PROCEDURE — 99214 OFFICE O/P EST MOD 30 MIN: CPT

## 2025-07-22 SDOH — ECONOMIC STABILITY: FOOD INSECURITY: WITHIN THE PAST 12 MONTHS, THE FOOD YOU BOUGHT JUST DIDN'T LAST AND YOU DIDN'T HAVE MONEY TO GET MORE.: NEVER TRUE

## 2025-07-22 SDOH — ECONOMIC STABILITY: FOOD INSECURITY: WITHIN THE PAST 12 MONTHS, YOU WORRIED THAT YOUR FOOD WOULD RUN OUT BEFORE YOU GOT MONEY TO BUY MORE.: NEVER TRUE

## 2025-07-22 ASSESSMENT — PATIENT HEALTH QUESTIONNAIRE - PHQ9
1. LITTLE INTEREST OR PLEASURE IN DOING THINGS: NOT AT ALL
SUM OF ALL RESPONSES TO PHQ QUESTIONS 1-9: 0
SUM OF ALL RESPONSES TO PHQ QUESTIONS 1-9: 0
2. FEELING DOWN, DEPRESSED OR HOPELESS: NOT AT ALL
SUM OF ALL RESPONSES TO PHQ QUESTIONS 1-9: 0
SUM OF ALL RESPONSES TO PHQ QUESTIONS 1-9: 0

## 2025-07-22 ASSESSMENT — ENCOUNTER SYMPTOMS
COUGH: 0
WHEEZING: 0
RESPIRATORY NEGATIVE: 1
BLOOD IN STOOL: 0
SHORTNESS OF BREATH: 0
CONSTIPATION: 0
ALLERGIC/IMMUNOLOGIC NEGATIVE: 1
EYES NEGATIVE: 1
DIARRHEA: 0

## 2025-07-22 NOTE — PROGRESS NOTES
\"Have you been to the ER, urgent care clinic since your last visit?  Hospitalized since your last visit?\"    Yes 2 days ago [facility] for migraine and anemia    “Have you seen or consulted any other health care providers outside of [facility] since your last visit?”      no  Have you had a mammogram?”   no    No breast cancer screening on file             Click Here for Release of Records Request      Chief Complaint   Patient presents with    tb screenung for work         Vitals:    07/22/25 0855   BP: 130/82   Pulse: 80   Resp: 20   Temp: 98 °F (36.7 °C)   SpO2: 99%

## 2025-07-22 NOTE — PROGRESS NOTES
Chief Complaint   Patient presents with    tb screenung for work    Anemia     [facility] follow up          HPI:      History of Present Illness  The patient is a 40-year-old female who presents for an employment screening.    Headaches and Low Iron Levels  She has been under the care of Sheela Lou for headaches. There is concern about her iron levels, as her ferritin remains low despite oral iron supplementation. She is interested in trying natural remedies before considering intravenous iron therapy. Her diet includes meat, peanuts, broccoli, spinach, black beans, and beet juice mixed with kiwi, apples, and water, which she consumes daily. - Character: Headaches and low ferritin levels despite oral iron supplementation.  - Alleviating/Aggravating Factors: Interested in trying natural remedies before considering intravenous iron therapy.  - Diet: Consumes meat, peanuts, broccoli, spinach, black beans, and beet juice mixed with kiwi, apples, and water daily.    Heavier Menstrual Periods  Recently, she has noticed heavier menstrual periods lasting more than 3 days compared to her usual 3-day cycle.  - Onset: Recently.  - Duration: Periods lasting more than 3 days compared to usual 3-day cycle.  - Character: Heavier menstrual periods.    Clearance for Employment  She reports no history of tuberculosis infection, cough, fevers, night sweats, or weight loss.  She requires TB testing today for employment.    FAMILY HISTORY  The patient's mother had a stroke.      No Known Allergies    Current Outpatient Medications   Medication Sig Dispense Refill    rizatriptan (MAXALT) 10 MG tablet 1 tablet at onset of migraine may repeat every 2 hours to a maximum of 3 tablets per 24 hours in 2 days per 7-day cycle 9 tablet 11    topiramate (TOPAMAX) 50 MG tablet Take 1 tablet by mouth 2 times daily 180 tablet 3    SV IRON 325 (65 Fe) MG tablet Take 1 tablet by mouth once daily with breakfast 90 tablet 0    vitamin D (ERGOCALCIFEROL)

## 2025-07-28 LAB
GAMMA INTERFERON BACKGROUND BLD IA-ACNC: 0.04 IU/ML
M TB IFN-G BLD-IMP: NEGATIVE
M TB IFN-G CD4+ BCKGRND COR BLD-ACNC: 0.03 IU/ML
M TB IFN-G CD4+CD8+ BCKGRND COR BLD-ACNC: 0.03 IU/ML
MITOGEN IGNF BCKGRD COR BLD-ACNC: >10 IU/ML
QUANTIFERON, INCUBATION: NORMAL
SERVICE CMNT-IMP: NORMAL

## 2025-09-02 ENCOUNTER — OFFICE VISIT (OUTPATIENT)
Age: 40
End: 2025-09-02
Payer: COMMERCIAL

## 2025-09-02 VITALS
TEMPERATURE: 98.6 F | BODY MASS INDEX: 43.77 KG/M2 | SYSTOLIC BLOOD PRESSURE: 130 MMHG | OXYGEN SATURATION: 100 % | HEART RATE: 69 BPM | HEIGHT: 64 IN | WEIGHT: 256.4 LBS | DIASTOLIC BLOOD PRESSURE: 76 MMHG | RESPIRATION RATE: 20 BRPM

## 2025-09-02 DIAGNOSIS — D50.9 IRON DEFICIENCY ANEMIA, UNSPECIFIED IRON DEFICIENCY ANEMIA TYPE: Primary | ICD-10-CM

## 2025-09-02 PROCEDURE — 36415 COLL VENOUS BLD VENIPUNCTURE: CPT

## 2025-09-02 PROCEDURE — G2211 COMPLEX E/M VISIT ADD ON: HCPCS

## 2025-09-02 PROCEDURE — 99213 OFFICE O/P EST LOW 20 MIN: CPT

## 2025-09-02 ASSESSMENT — PATIENT HEALTH QUESTIONNAIRE - PHQ9
SUM OF ALL RESPONSES TO PHQ QUESTIONS 1-9: 0
1. LITTLE INTEREST OR PLEASURE IN DOING THINGS: NOT AT ALL
SUM OF ALL RESPONSES TO PHQ QUESTIONS 1-9: 0
2. FEELING DOWN, DEPRESSED OR HOPELESS: NOT AT ALL
SUM OF ALL RESPONSES TO PHQ QUESTIONS 1-9: 0
SUM OF ALL RESPONSES TO PHQ QUESTIONS 1-9: 0

## 2025-09-02 ASSESSMENT — ENCOUNTER SYMPTOMS
COUGH: 0
EYES NEGATIVE: 1
WHEEZING: 0
ALLERGIC/IMMUNOLOGIC NEGATIVE: 1
BLOOD IN STOOL: 0
DIARRHEA: 0
RESPIRATORY NEGATIVE: 1
CONSTIPATION: 0
SHORTNESS OF BREATH: 0

## 2025-09-03 LAB
BASOPHILS # BLD: 0.03 K/UL (ref 0–0.1)
BASOPHILS NFR BLD: 0.6 % (ref 0–1)
DIFFERENTIAL METHOD BLD: ABNORMAL
EOSINOPHIL # BLD: 0.07 K/UL (ref 0–0.4)
EOSINOPHIL NFR BLD: 1.4 % (ref 0–7)
ERYTHROCYTE [DISTWIDTH] IN BLOOD BY AUTOMATED COUNT: 21.2 % (ref 11.5–14.5)
FERRITIN SERPL-MCNC: 14 NG/ML (ref 13–252)
HCT VFR BLD AUTO: 41.1 % (ref 35–47)
HGB BLD-MCNC: 11.9 G/DL (ref 11.5–16)
IMM GRANULOCYTES # BLD AUTO: 0.01 K/UL (ref 0–0.04)
IMM GRANULOCYTES NFR BLD AUTO: 0.2 % (ref 0–0.5)
IRON SATN MFR SERPL: 56 %
IRON SERPL-MCNC: 188 UG/DL (ref 37–145)
LYMPHOCYTES # BLD: 1.58 K/UL (ref 0.8–3.5)
LYMPHOCYTES NFR BLD: 31 % (ref 12–49)
MCH RBC QN AUTO: 24.7 PG (ref 26–34)
MCHC RBC AUTO-ENTMCNC: 29 G/DL (ref 30–36.5)
MCV RBC AUTO: 85.3 FL (ref 80–99)
MONOCYTES # BLD: 0.54 K/UL (ref 0–1)
MONOCYTES NFR BLD: 10.6 % (ref 5–13)
NEUTS SEG # BLD: 2.87 K/UL (ref 1.8–8)
NEUTS SEG NFR BLD: 56.2 % (ref 32–75)
NRBC # BLD: 0 K/UL (ref 0–0.01)
NRBC BLD-RTO: 0 PER 100 WBC
PLATELET # BLD AUTO: 268 K/UL (ref 150–400)
PMV BLD AUTO: 10.9 FL (ref 8.9–12.9)
RBC # BLD AUTO: 4.82 M/UL (ref 3.8–5.2)
RBC MORPH BLD: ABNORMAL
TIBC SERPL-MCNC: 333 UG/DL (ref 250–450)
UIBC SERPL-MCNC: 145 UG/DL (ref 112–347)
WBC # BLD AUTO: 5.1 K/UL (ref 3.6–11)